# Patient Record
Sex: FEMALE | Race: ASIAN | Employment: STUDENT | URBAN - METROPOLITAN AREA
[De-identification: names, ages, dates, MRNs, and addresses within clinical notes are randomized per-mention and may not be internally consistent; named-entity substitution may affect disease eponyms.]

---

## 2024-01-02 ENCOUNTER — OFFICE VISIT (OUTPATIENT)
Dept: URGENT CARE | Facility: URGENT CARE | Age: 19
End: 2024-01-02

## 2024-01-02 VITALS
HEART RATE: 102 BPM | OXYGEN SATURATION: 98 % | SYSTOLIC BLOOD PRESSURE: 121 MMHG | WEIGHT: 127 LBS | TEMPERATURE: 99 F | DIASTOLIC BLOOD PRESSURE: 80 MMHG

## 2024-01-02 DIAGNOSIS — R50.9 FEVER, UNSPECIFIED: ICD-10-CM

## 2024-01-02 DIAGNOSIS — J10.1 INFLUENZA A: Primary | ICD-10-CM

## 2024-01-02 LAB
FLUAV AG SPEC QL IA: POSITIVE
FLUBV AG SPEC QL IA: NEGATIVE

## 2024-01-02 PROCEDURE — 99203 OFFICE O/P NEW LOW 30 MIN: CPT | Performed by: FAMILY MEDICINE

## 2024-01-02 PROCEDURE — 87804 INFLUENZA ASSAY W/OPTIC: CPT | Performed by: FAMILY MEDICINE

## 2024-01-02 NOTE — PROGRESS NOTES
Assessment & Plan     Fever, unspecified  - Influenza A/B antigen    Influenza A     Out of treatment window for tamiflu. Supportive care advised. With resoltion of fever and improving symptoms, onset greater than 3 days ago okay to return to school tomorrow if wearing face covering.      Clear lungs - low suspicion for pneumonia    . School excuse letter provided.     See AVS summary for additional recommendations reviewed with patient during this visit.         Agustin Ribera MD   [facility] [facility]    Jennifer Riley is a 18 year old female who presents to clinic today for the following health issues:  Chief Complaint   Patient presents with    Urgent Care    Cough    Fever    Throat Pain     Pt in clinic to have eval for cough and fever for 5 days.     HPI      Has done antigen COVID testing which is negative. Roommate also sick . Highest temp was 3 days ago recorded at 102.3F    No vomiting/diarrhea  Remedies attempted: OTC cold remedies    No hx of lung disease     was used to facilitate this visit    There are no problems to display for this patient.      Current Outpatient Medications   Medication    Nutritional Supplements (COLD AND FLU PO)     No current facility-administered medications for this visit.           Objective    /80   Pulse 102   Temp 99  F (37.2  C) (Temporal)   Wt 57.6 kg (127 lb)   LMP 01/02/2023   SpO2 98%   Physical Exam       CV: RRR no m/r/g  Pulm: clear bilaterally  GEN: NAD  Throat: no enlarged tonsils mallampati III/IV  Results for orders placed or performed in visit on 01/02/24   Influenza A/B antigen     Status: Abnormal    Specimen: Nose; Swab   Result Value Ref Range    Influenza A antigen Positive (A) Negative    Influenza B antigen Negative Negative    Narrative    Test results must be correlated with clinical data. If necessary, results should be confirmed by a molecular assay or viral culture.                     The use of  [employer] dictation services may have been used to construct the content in this note; any grammatical or spelling errors are non-intentional. Please contact the author of this note directly if you are in need of any clarification.

## 2024-01-02 NOTE — LETTER
January 2, 2024      Rosemary Candelario  [address] [place] [address]        To Whom It May Concern:    Rosemary Candelario  was seen on 1/2/24 and diagnosed with influenza as her symptoms have been present for > 3 days and fever has resolved she is okay to return to school while wearing a standard mask. Please excuse from missed school for any 5 day period from onset of illness from last week Friday        Sincerely,        Agustin Ribera MD